# Patient Record
Sex: FEMALE | Race: BLACK OR AFRICAN AMERICAN | Employment: PART TIME | URBAN - METROPOLITAN AREA
[De-identification: names, ages, dates, MRNs, and addresses within clinical notes are randomized per-mention and may not be internally consistent; named-entity substitution may affect disease eponyms.]

---

## 2023-04-12 ENCOUNTER — ANESTHESIA EVENT (OUTPATIENT)
Dept: SURGERY | Facility: AMBULATORY SURGERY CENTER | Age: 38
End: 2023-04-12
Payer: MEDICAID

## 2023-04-13 ENCOUNTER — ANESTHESIA (OUTPATIENT)
Dept: SURGERY | Facility: AMBULATORY SURGERY CENTER | Age: 38
End: 2023-04-13
Payer: MEDICAID

## 2023-04-13 ENCOUNTER — HOSPITAL ENCOUNTER (OUTPATIENT)
Facility: AMBULATORY SURGERY CENTER | Age: 38
Discharge: HOME | End: 2023-04-13
Attending: OBSTETRICS & GYNECOLOGY
Payer: MEDICAID

## 2023-04-13 VITALS
BODY MASS INDEX: 28.71 KG/M2 | OXYGEN SATURATION: 100 % | HEART RATE: 57 BPM | SYSTOLIC BLOOD PRESSURE: 106 MMHG | WEIGHT: 156 LBS | HEIGHT: 62 IN | RESPIRATION RATE: 16 BRPM | DIASTOLIC BLOOD PRESSURE: 64 MMHG | TEMPERATURE: 97.6 F

## 2023-04-13 DIAGNOSIS — R52 MODERATE PAIN: Primary | ICD-10-CM

## 2023-04-13 LAB
HCG UR QL: NEGATIVE
INTERNAL QC OK POCT: NORMAL
POCT KIT EXPIRATION DATE: NORMAL
POCT KIT LOT NUMBER: NORMAL

## 2023-04-13 RX ORDER — ONDANSETRON 4 MG/1
4 TABLET, ORALLY DISINTEGRATING ORAL
Status: DISCONTINUED | OUTPATIENT
Start: 2023-04-13 | End: 2023-04-14 | Stop reason: HOSPADM

## 2023-04-13 RX ORDER — OXYCODONE HYDROCHLORIDE 10 MG/1
10 TABLET ORAL
Status: DISCONTINUED | OUTPATIENT
Start: 2023-04-13 | End: 2023-04-14 | Stop reason: HOSPADM

## 2023-04-13 RX ORDER — IBUPROFEN 800 MG/1
800 TABLET, FILM COATED ORAL EVERY 8 HOURS PRN
Qty: 30 TABLET | Refills: 0 | Status: SHIPPED | OUTPATIENT
Start: 2023-04-13

## 2023-04-13 RX ORDER — FENTANYL CITRATE 0.05 MG/ML
25 INJECTION, SOLUTION INTRAMUSCULAR; INTRAVENOUS
Status: DISCONTINUED | OUTPATIENT
Start: 2023-04-13 | End: 2023-04-14 | Stop reason: HOSPADM

## 2023-04-13 RX ORDER — SODIUM CHLORIDE, SODIUM LACTATE, POTASSIUM CHLORIDE, CALCIUM CHLORIDE 600; 310; 30; 20 MG/100ML; MG/100ML; MG/100ML; MG/100ML
INJECTION, SOLUTION INTRAVENOUS CONTINUOUS
Status: DISCONTINUED | OUTPATIENT
Start: 2023-04-13 | End: 2023-04-14 | Stop reason: HOSPADM

## 2023-04-13 RX ORDER — LIDOCAINE HYDROCHLORIDE 10 MG/ML
INJECTION, SOLUTION EPIDURAL; INFILTRATION; INTRACAUDAL; PERINEURAL PRN
Status: DISCONTINUED | OUTPATIENT
Start: 2023-04-13 | End: 2023-04-13 | Stop reason: HOSPADM

## 2023-04-13 RX ORDER — LIDOCAINE 40 MG/G
CREAM TOPICAL
Status: DISCONTINUED | OUTPATIENT
Start: 2023-04-13 | End: 2023-04-14 | Stop reason: HOSPADM

## 2023-04-13 RX ORDER — OXYCODONE HYDROCHLORIDE 5 MG/1
5 TABLET ORAL
Status: DISCONTINUED | OUTPATIENT
Start: 2023-04-13 | End: 2023-04-14 | Stop reason: HOSPADM

## 2023-04-13 RX ORDER — ONDANSETRON 2 MG/ML
INJECTION INTRAMUSCULAR; INTRAVENOUS PRN
Status: DISCONTINUED | OUTPATIENT
Start: 2023-04-13 | End: 2023-04-13

## 2023-04-13 RX ORDER — IBUPROFEN 800 MG/1
800 TABLET, FILM COATED ORAL EVERY 6 HOURS PRN
Status: DISCONTINUED | OUTPATIENT
Start: 2023-04-13 | End: 2023-04-14 | Stop reason: HOSPADM

## 2023-04-13 RX ORDER — IBUPROFEN 600 MG/1
600 TABLET, FILM COATED ORAL
Status: COMPLETED | OUTPATIENT
Start: 2023-04-13 | End: 2023-04-13

## 2023-04-13 RX ORDER — PROPOFOL 10 MG/ML
INJECTION, EMULSION INTRAVENOUS CONTINUOUS PRN
Status: DISCONTINUED | OUTPATIENT
Start: 2023-04-13 | End: 2023-04-13

## 2023-04-13 RX ADMIN — PROPOFOL 150 MCG/KG/MIN: 10 INJECTION, EMULSION INTRAVENOUS at 07:35

## 2023-04-13 RX ADMIN — SODIUM CHLORIDE, SODIUM LACTATE, POTASSIUM CHLORIDE, CALCIUM CHLORIDE: 600; 310; 30; 20 INJECTION, SOLUTION INTRAVENOUS at 07:30

## 2023-04-13 RX ADMIN — IBUPROFEN 600 MG: 600 TABLET, FILM COATED ORAL at 08:32

## 2023-04-13 RX ADMIN — ONDANSETRON 4 MG: 2 INJECTION INTRAMUSCULAR; INTRAVENOUS at 07:40

## 2023-04-13 NOTE — OP NOTE
Yoselin Coreas  1985 4/13/2023      Preop Dx: 1. Retained IUD    Postop Dx:  Same                          Procedure: IUD removal    Anesthesia:  General. Paracervical block    Surgeon:  Karin Falcon MD    Procedure:  The patient was brought to the operating room where following general anesthesia was placed in the dorsolithotomy position where she was prepped and draped.     A speculum was placed.  The cervix was grasped with a tenaculum. Paracervical block was performed.     Packing forceps used to palpate the IUD, and easily removed intact.    The instruments were removed from the vagina and all areas were hemostatic. Silver nitrate was used on the tenaculum sites.    All sponge, needle, and instrument counts were correct.  The patient was awakened and removed to the recovery room in stable condition.    Karin Falcon MD  April 13, 2023

## 2023-04-13 NOTE — H&P
"Yoselin Coreas is an 37 year old female. Patient has a retained IUD.  She was unable to tolerate removal in the office and needed anesthesia.    History reviewed. No pertinent past medical history.    Allergies: No Known Allergies    Active Problems:    * No active hospital problems. *    Blood pressure 113/64, pulse 63, temperature 97.6  F (36.4  C), temperature source Temporal, resp. rate 16, height 1.575 m (5' 2\"), weight 70.8 kg (156 lb), last menstrual period 03/28/2023, SpO2 99 %.    Review of Systems   All other systems reviewed and are negative.      Physical Exam  Constitutional:       Appearance: Normal appearance.   Cardiovascular:      Rate and Rhythm: Normal rate and regular rhythm.   Pulmonary:      Effort: Pulmonary effort is normal.      Breath sounds: Normal breath sounds.   Musculoskeletal:      Cervical back: Normal range of motion and neck supple.   Neurological:      Mental Status: She is alert.         Assessment:  37 year old female with retained IUD    Plan:  1. OR for anesthesia and removal.    Karin Falcon MD  4/13/2023  "

## 2023-04-13 NOTE — ANESTHESIA PREPROCEDURE EVALUATION
Anesthesia Pre-Procedure Evaluation    Patient: Yoselin Coreas   MRN: [id number] : 1985        Procedure : Procedure(s):  HYSTEROSCOPY WITH INTRAUTERINE DEVICE REMOVAL          History reviewed. No pertinent past medical history.   Past Surgical History:   Procedure Laterality Date     APPENDECTOMY OPEN N/A 2014    Procedure: APPENDECTOMY OPEN;  Surgeon: Gege Kim MD;  Location: [facility] OR      No Known Allergies   Social History     Tobacco Use     Smoking status: Never     Smokeless tobacco: Not on file   Vaping Use     Vaping status: Not on file   Substance Use Topics     Alcohol use: Yes      Wt Readings from Last 1 Encounters:   14 70.8 kg (156 lb)        Anesthesia Evaluation   Pt has had prior anesthetic.     No history of anesthetic complications       ROS/MED HX  ENT/Pulmonary:     (+) Intermittent, asthma     Neurologic:  - neg neurologic ROS     Cardiovascular:  - neg cardiovascular ROS     METS/Exercise Tolerance: >4 METS    Hematologic:  - neg hematologic  ROS     Musculoskeletal:  - neg musculoskeletal ROS     GI/Hepatic:     (+) GERD, Asymptomatic on medication,     Renal/Genitourinary:  - neg Renal ROS     Endo:  - neg endo ROS     Psychiatric/Substance Use:  - neg psychiatric ROS     Infectious Disease:  - neg infectious disease ROS     Malignancy:       Other:            Physical Exam    Airway        Mallampati: II   TM distance: > 3 FB   Neck ROM: full     Respiratory Devices and Support         Dental       (+) Minor Abnormalities - some fillings, tiny chips    B=Bridge, C=Chipped, L=Loose, M=Missing    Cardiovascular          Rhythm and rate: regular     Pulmonary           breath sounds clear to auscultation           OUTSIDE LABS:  CBC:   Lab Results   Component Value Date    WBC 5.8 2014    WBC 11.8 (H) 2014    HGB 12.1 2014    HGB 12.4 2014    HCT 37.0 2014    HCT 36.5 2014     2014     2014     BMP:   Lab  Results   Component Value Date     12/30/2014     12/28/2014    POTASSIUM 4.0 12/30/2014    POTASSIUM 3.4 12/28/2014    CHLORIDE 108 12/30/2014    CHLORIDE 107 12/28/2014    CO2 26 12/30/2014    CO2 23 12/28/2014    BUN 11 12/30/2014    BUN 9 12/28/2014    CR 0.80 12/30/2014    CR 0.69 12/28/2014    GLC 81 12/30/2014    GLC 80 12/28/2014     COAGS: No results found for: PTT, INR, FIBR  POC:   Lab Results   Component Value Date    HCGS Negative 12/30/2014     HEPATIC:   Lab Results   Component Value Date    ALBUMIN 3.3 (L) 12/30/2014    PROTTOTAL 7.8 12/30/2014    ALT 28 12/30/2014    AST 20 12/30/2014    ALKPHOS 45 12/30/2014    BILITOTAL 0.3 12/30/2014     OTHER:   Lab Results   Component Value Date    LACT 1.1 12/30/2014    MADELAINE 8.4 (L) 12/30/2014    LIPASE 175 12/30/2014    CRP 37.1 (H) 12/30/2014       Anesthesia Plan    ASA Status:  2   NPO Status:  NPO Appropriate    Anesthesia Type: MAC.   Induction: N/a.   Maintenance: TIVA.        Consents    Anesthesia Plan(s) and associated risks, benefits, and realistic alternatives discussed. Questions answered and patient/representative(s) expressed understanding.    - Discussed:     - Discussed with:  Patient         Postoperative Care    Pain management: Multi-modal analgesia.   PONV prophylaxis: Ondansetron (or other 5HT-3), Dexamethasone or Solumedrol     Comments:    Other Comments:     Propofol gtt  Robinul  Lidocaine  Zofran, decadron 4 mg  LMA prn  Toradol if ok with surgeon              Hanny Shirley MD

## 2023-04-13 NOTE — ANESTHESIA POSTPROCEDURE EVALUATION
Patient: Yoselin Coreas    Procedure: Procedure(s):  INTRAUTERINE DEVICE REMOVAL       Anesthesia Type:  MAC    Note:     Postop Pain Control: Uneventful            Sign Out: Well controlled pain   PONV: No   Neuro/Psych: Uneventful            Sign Out: Acceptable/Baseline neuro status   Airway/Respiratory: Uneventful            Sign Out: Acceptable/Baseline resp. status   CV/Hemodynamics:    Other NRE: NONE   DID A NON-ROUTINE EVENT OCCUR? No           Last vitals:  Vitals Value Taken Time   /64 04/13/23 0830   Temp 97.6  F (36.4  C) 04/13/23 0753   Pulse 63 04/13/23 0833   Resp 16 04/13/23 0830   SpO2 100 % 04/13/23 0833   Vitals shown include unvalidated device data.    Electronically Signed By: Hanny Shirley MD  April 13, 2023  10:17 AM

## 2023-04-13 NOTE — DISCHARGE INSTRUCTIONS
Adult Discharge Orders & Instructions     For 24 hours after surgery    Get plenty of rest.  A responsible adult must stay with you for at least 24 hours after you leave the hospital.   Do not drive or use heavy equipment.  If you have weakness or tingling, don't drive or use heavy equipment until this feeling goes away.  Do not drink alcohol.  Avoid strenuous or risky activities.  Ask for help when climbing stairs.   You may feel lightheaded.  IF so, sit for a few minutes before standing.  Have someone help you get up.   If you have nausea (feel sick to your stomach): Drink only clear liquids such as apple juice, ginger ale, broth or 7-Up.  Rest may also help.  Be sure to drink enough fluids.  Move to a regular diet as you feel able.  You may have a slight fever. Call the doctor if your fever is over 100 F (37.7 C) (taken under the tongue) or lasts longer than 24 hours.  You may have a dry mouth, a sore throat, muscle aches or trouble sleeping.  These should go away after 24 hours.  Do not make important or legal decisions.     Take tylenol and ibuprofen every 6 hours as your main form of pain control.     Do not exceed 4,000 mg of acetaminophen during a 24 hour period  or 1000 mg per dose. Keep in mind that acetaminophen can also be found in many over-the-counter cold medications as well as narcotics that may be given for pain.

## 2023-04-13 NOTE — ANESTHESIA CARE TRANSFER NOTE
Patient: Yoselin Coreas    Procedure: Procedure(s):  INTRAUTERINE DEVICE REMOVAL       Diagnosis: Retained intrauterine contraceptive device (IUD) [T83.39XA]  Diagnosis Additional Information: No value filed.    Anesthesia Type:   MAC     Note:    Oropharynx: oropharynx clear of all foreign objects and spontaneously breathing  Level of Consciousness: drowsy  Oxygen Supplementation: room air    Independent Airway: airway patency satisfactory and stable  Dentition: dentition unchanged  Vital Signs Stable: post-procedure vital signs reviewed and stable  Report to RN Given: handoff report given  Patient transferred to: Phase II    Handoff Report: Identifed the Patient, Identified the Reponsible Provider, Reviewed the pertinent medical history, Discussed the surgical course, Reviewed Intra-OP anesthesia mangement and issues during anesthesia, Set expectations for post-procedure period and Allowed opportunity for questions and acknowledgement of understanding      Vitals:  Vitals Value Taken Time   /62    Temp 36.4  C (97.6  F) 04/13/23 0753   Pulse 63 04/13/23 0753   Resp 16 04/13/23 0753   SpO2 99 % 04/13/23 0753       Electronically Signed By: KATHERINE Alaniz CRNA  April 13, 2023  7:58 AM